# Patient Record
Sex: MALE | Race: WHITE | URBAN - METROPOLITAN AREA
[De-identification: names, ages, dates, MRNs, and addresses within clinical notes are randomized per-mention and may not be internally consistent; named-entity substitution may affect disease eponyms.]

---

## 2017-01-15 DIAGNOSIS — G30.1 LATE ONSET ALZHEIMER'S DISEASE WITH BEHAVIORAL DISTURBANCE (HCC): ICD-10-CM

## 2017-01-15 DIAGNOSIS — F02.818 LATE ONSET ALZHEIMER'S DISEASE WITH BEHAVIORAL DISTURBANCE (HCC): ICD-10-CM

## 2017-01-16 RX ORDER — MEMANTINE HYDROCHLORIDE 28 MG/1
CAPSULE, EXTENDED RELEASE ORAL
Qty: 30 CAP | Refills: 0 | Status: SHIPPED | OUTPATIENT
Start: 2017-01-16 | End: 2017-03-12 | Stop reason: SDUPTHER

## 2017-02-13 RX ORDER — MEMANTINE HYDROCHLORIDE 28 MG/1
CAPSULE, EXTENDED RELEASE ORAL
Qty: 30 CAP | Refills: 0 | Status: SHIPPED | OUTPATIENT
Start: 2017-02-13 | End: 2017-05-12 | Stop reason: SDUPTHER

## 2017-03-12 DIAGNOSIS — G30.1 LATE ONSET ALZHEIMER'S DISEASE WITH BEHAVIORAL DISTURBANCE (HCC): ICD-10-CM

## 2017-03-12 DIAGNOSIS — F02.818 LATE ONSET ALZHEIMER'S DISEASE WITH BEHAVIORAL DISTURBANCE (HCC): ICD-10-CM

## 2017-03-13 RX ORDER — MEMANTINE HYDROCHLORIDE 28 MG/1
CAPSULE, EXTENDED RELEASE ORAL
Qty: 30 CAP | Refills: 0 | Status: SHIPPED | OUTPATIENT
Start: 2017-03-13 | End: 2017-04-11 | Stop reason: SDUPTHER

## 2017-04-11 DIAGNOSIS — G30.1 LATE ONSET ALZHEIMER'S DISEASE WITH BEHAVIORAL DISTURBANCE (HCC): ICD-10-CM

## 2017-04-11 DIAGNOSIS — F02.818 LATE ONSET ALZHEIMER'S DISEASE WITH BEHAVIORAL DISTURBANCE (HCC): ICD-10-CM

## 2017-04-11 RX ORDER — MEMANTINE HYDROCHLORIDE 28 MG/1
CAPSULE, EXTENDED RELEASE ORAL
Qty: 30 CAP | Refills: 0 | Status: SHIPPED | OUTPATIENT
Start: 2017-04-11 | End: 2017-05-12 | Stop reason: SDUPTHER

## 2017-04-24 DIAGNOSIS — F02.818 LATE ONSET ALZHEIMER'S DISEASE WITH BEHAVIORAL DISTURBANCE (HCC): ICD-10-CM

## 2017-04-24 DIAGNOSIS — G30.1 LATE ONSET ALZHEIMER'S DISEASE WITH BEHAVIORAL DISTURBANCE (HCC): ICD-10-CM

## 2017-04-24 NOTE — TELEPHONE ENCOUNTER
----- Message from Guerita Portillo sent at 4/24/2017 12:08 PM EDT -----  Regarding: MARIO Fuller/Refill  Patient needs a refill of \"Rivastipmin\" sent to the [facility] at [phone].

## 2017-04-25 RX ORDER — RIVASTIGMINE 13.3 MG/24H
PATCH, EXTENDED RELEASE TRANSDERMAL
Qty: 90 PATCH | Refills: 2 | Status: SHIPPED | OUTPATIENT
Start: 2017-04-25 | End: 2017-07-17 | Stop reason: SDUPTHER

## 2017-05-11 ENCOUNTER — ED HISTORICAL/CONVERTED ENCOUNTER (OUTPATIENT)
Dept: OTHER | Age: 72
End: 2017-05-11

## 2017-05-16 ENCOUNTER — OP HISTORICAL/CONVERTED ENCOUNTER (OUTPATIENT)
Dept: OTHER | Age: 72
End: 2017-05-16

## 2017-06-08 DIAGNOSIS — G30.1 LATE ONSET ALZHEIMER'S DISEASE WITH BEHAVIORAL DISTURBANCE (HCC): ICD-10-CM

## 2017-06-08 DIAGNOSIS — F02.818 LATE ONSET ALZHEIMER'S DISEASE WITH BEHAVIORAL DISTURBANCE (HCC): ICD-10-CM

## 2017-06-09 RX ORDER — MEMANTINE HYDROCHLORIDE 28 MG/1
CAPSULE, EXTENDED RELEASE ORAL
Qty: 30 CAP | Refills: 0 | Status: SHIPPED | OUTPATIENT
Start: 2017-06-09 | End: 2017-07-17 | Stop reason: SDUPTHER

## 2017-07-17 ENCOUNTER — OFFICE VISIT (OUTPATIENT)
Dept: NEUROLOGY | Age: 72
End: 2017-07-17

## 2017-07-17 VITALS
WEIGHT: 206 LBS | HEIGHT: 69 IN | DIASTOLIC BLOOD PRESSURE: 64 MMHG | OXYGEN SATURATION: 98 % | RESPIRATION RATE: 20 BRPM | SYSTOLIC BLOOD PRESSURE: 106 MMHG | BODY MASS INDEX: 30.51 KG/M2 | HEART RATE: 62 BPM

## 2017-07-17 DIAGNOSIS — G30.1 LATE ONSET ALZHEIMER'S DISEASE WITH BEHAVIORAL DISTURBANCE (HCC): ICD-10-CM

## 2017-07-17 DIAGNOSIS — F02.80 DEMENTIA IN ALZHEIMER'S DISEASE WITH LATE ONSET: ICD-10-CM

## 2017-07-17 DIAGNOSIS — G30.1 DEMENTIA IN ALZHEIMER'S DISEASE WITH LATE ONSET: ICD-10-CM

## 2017-07-17 DIAGNOSIS — F02.818 LATE ONSET ALZHEIMER'S DISEASE WITH BEHAVIORAL DISTURBANCE (HCC): ICD-10-CM

## 2017-07-17 RX ORDER — RIVASTIGMINE 13.3 MG/24H
PATCH, EXTENDED RELEASE TRANSDERMAL
Qty: 90 PATCH | Refills: 2 | Status: SHIPPED | OUTPATIENT
Start: 2017-07-17 | End: 2018-08-21 | Stop reason: SDUPTHER

## 2017-07-17 RX ORDER — MEMANTINE HYDROCHLORIDE 28 MG/1
CAPSULE, EXTENDED RELEASE ORAL
Qty: 30 CAP | Refills: 0 | Status: CANCELLED | OUTPATIENT
Start: 2017-07-17

## 2017-07-17 RX ORDER — RIVASTIGMINE 13.3 MG/24H
PATCH, EXTENDED RELEASE TRANSDERMAL
Qty: 90 PATCH | Refills: 2 | Status: CANCELLED | OUTPATIENT
Start: 2017-07-17

## 2017-07-17 RX ORDER — MEMANTINE HYDROCHLORIDE 28 MG/1
28 CAPSULE, EXTENDED RELEASE ORAL DAILY
Qty: 90 CAP | Refills: 3 | Status: SHIPPED | OUTPATIENT
Start: 2017-07-17 | End: 2018-06-15 | Stop reason: SDUPTHER

## 2017-07-17 NOTE — MR AVS SNAPSHOT
Visit Information Date & Time Provider Department Dept. Phone Encounter #  
 7/17/2017  1:30 PM Justin Conte NP [facility] [phone] [id number] Follow-up Instructions Return in about 6 months (around 1/17/2018). Upcoming Health Maintenance Date Due DTaP/Tdap/Td series (1 - Tdap) 2/8/1964 FOBT Q 1 YEAR AGE 50-75 2/8/1993 ZOSTER VACCINE AGE 60> 2/8/2003 GLAUCOMA SCREENING Q2Y 2/8/2008 Pneumococcal 65+ Low/Medium Risk (1 of 2 - PCV13) 2/8/2008 MEDICARE YEARLY EXAM 2/8/2008 INFLUENZA AGE 9 TO ADULT 8/1/2017 Allergies as of 7/17/2017  Review Complete On: 7/17/2017 By: Justin Conte NP No Known Allergies Current Immunizations  Never Reviewed No immunizations on file. Not reviewed this visit You Were Diagnosed With   
  
 Codes Comments Late onset Alzheimer's disease with behavioral disturbance     ICD-10-CM: G30.1, F02.81 ICD-9-CM: 331.0, 294.11 Dementia in Alzheimer's disease with late onset     ICD-10-CM: G30.1, F02.80 ICD-9-CM: 331.0, 294.10 Vitals BP Pulse Resp Height(growth percentile) Weight(growth percentile) SpO2  
 106/64 62 20 5' 9\" (1.753 m) 206 lb (93.4 kg) 98% BMI Smoking Status 30.42 kg/m2 Never Smoker Vitals History BMI and BSA Data Body Mass Index Body Surface Area  
 30.42 kg/m 2 2.13 m 2 Preferred Pharmacy Pharmacy Name Phone [facility] [address] [facility] [phone] Your Updated Medication List  
  
   
This list is accurate as of: 7/17/17  3:02 PM.  Always use your most recent med list.  
  
  
  
  
 ascorbic acid (vitamin C) 500 mg tablet Commonly known as:  VITAMIN C Take  by mouth.  
  
 levothyroxine 75 mcg tablet Commonly known as:  SYNTHROID  
0.075 Tabs daily. memantine ER 28 mg capsule Commonly known as:  NAMENDA XR  
 Take 1 Cap by mouth daily. multivitamin tablet Commonly known as:  ONE A DAY Take 1 Tab by mouth daily. Nut. Min., Met.Dis.,MV, Min #2 20 gram MCTs /40 gram Pwpk Commonly known as:  AXONA Take 1 Packet by mouth daily. rivastigmine 13.3 mg/24 hour patch Commonly known as:  EXELON  
APPLY 1 PATCH TOPICALLY DAILY  
  
 VITAMIN D2 PO Take  by mouth. Prescriptions Sent to Pharmacy Refills  
 memantine ER (NAMENDA XR) 28 mg capsule 3 Sig: Take 1 Cap by mouth daily. Class: Normal  
 Pharmacy: [facility], [address] [place] [address] [address] [place] [address] Ph #: [phone] Route: Oral  
 rivastigmine (EXELON) 13.3 mg/24 hour patch 2 Sig: APPLY 1 PATCH TOPICALLY DAILY Class: Normal  
 Pharmacy: [facility], [address] [place] [address] [address] [facility] [place] [address] Ph #: [phone] Follow-up Instructions Return in about 6 months (around 1/17/2018). Patient Instructions A Healthy Lifestyle: Care Instructions Your Care Instructions A healthy lifestyle can help you feel good, stay at a healthy weight, and have plenty of energy for both work and play. A healthy lifestyle is something you can share with your whole family. A healthy lifestyle also can lower your risk for serious health problems, such as high blood pressure, heart disease, and diabetes. You can follow a few steps listed below to improve your health and the health of your family. Follow-up care is a key part of your treatment and safety. Be sure to make and go to all appointments, and call your doctor if you are having problems. Its also a good idea to know your test results and keep a list of the medicines you take. How can you care for yourself at home? · Do not eat too much sugar, fat, or fast foods. You can still have dessert and treats now and then. The goal is moderation. · Start small to improve your eating habits. Pay attention to portion sizes, drink less juice and soda pop, and eat more fruits and vegetables. ¨ Eat a healthy amount of food. A 3-ounce serving of meat, for example, is about the size of a deck of cards. Fill the rest of your plate with vegetables and whole grains. ¨ Limit the amount of soda and sports drinks you have every day. Drink more water when you are thirsty. ¨ Eat at least 5 servings of fruits and vegetables every day. It may seem like a lot, but it is not hard to reach this goal. A serving or helping is 1 piece of fruit, 1 cup of vegetables, or 2 cups of leafy, raw vegetables. Have an apple or some carrot sticks as an afternoon snack instead of a candy bar. Try to have fruits and/or vegetables at every meal. 
· Make exercise part of your daily routine. You may want to start with simple activities, such as walking, bicycling, or slow swimming. Try to be active 30 to 60 minutes every day. You do not need to do all 30 to 60 minutes all at once. For example, you can exercise 3 times a day for 10 or 20 minutes. Moderate exercise is safe for most people, but it is always a good idea to talk to your doctor before starting an exercise program. 
· Keep moving. [employer] the lawn, work in the garden, or [employer]. Take the stairs instead of the elevator at work. · If you smoke, quit. People who smoke have an increased risk for heart attack, stroke, cancer, and other lung illnesses. Quitting is hard, but there are ways to boost your chance of quitting tobacco for good. ¨ Use nicotine gum, patches, or lozenges. ¨ Ask your doctor about stop-smoking programs and medicines. ¨ Keep trying. In addition to reducing your risk of diseases in the future, you will notice some benefits soon after you stop using tobacco. If you have shortness of breath or asthma symptoms, they will likely get better within a few weeks after you quit. · Limit how much alcohol you drink. Moderate amounts of alcohol (up to 2 drinks a day for men, 1 drink a day for women) are okay. But drinking too much can lead to liver problems, high blood pressure, and other health problems. Family health If you have a family, there are many things you can do together to improve your health. · Eat meals together as a family as often as possible. · Eat healthy foods. This includes fruits, vegetables, lean meats and dairy, and whole grains. · Include your family in your fitness plan. Most people think of activities such as jogging or tennis as the way to fitness, but there are many ways you and your family can be more active. Anything that makes you breathe hard and gets your heart pumping is exercise. Here are some tips: 
¨ Walk to do errands or to take your child to school or the bus. ¨ Go for a family bike ride after dinner instead of watching TV. Where can you learn more? Go to http://miranda-mavis.info/. Enter A474 in the search box to learn more about \"A Healthy Lifestyle: Care Instructions. \" Current as of: July 26, 2016 Content Version: 11.3 © 1136-9981 [employer]. Care instructions adapted under license by [employer] (which disclaims liability or warranty for this information). If you have questions about a medical condition or this instruction, always ask your healthcare professional. [place] [address] any warranty or liability for your use of this information. Introducing [facility] & HEALTH SERVICES! [employer] introduces [email or web address] patient portal. Now you can access parts of your medical record, email your doctor's office, and request medication refills online. 1. In your internet browser, go to [email or web address] 2. Click on the First Time User? Click Here link in the Sign In box. You will see the New Member Sign Up page. 3. Enter your Apriva Access Code exactly as it appears below. You will not need to use this code after youve completed the sign-up process. If you do not sign up before the expiration date, you must request a new code. · Apriva Access Code: [email or web address] Expires: 10/15/2017  2:37 PM 
 
4. Enter the last four digits of your Social Security Number (xxxx) and Date of Birth (mm/dd/yyyy) as indicated and click Submit. You will be taken to the next sign-up page. 5. Create a Apriva ID. This will be your Apriva login ID and cannot be changed, so think of one that is secure and easy to remember. 6. Create a Apriva password. You can change your password at any time. 7. Enter your Password Reset Question and Answer. This can be used at a later time if you forget your password. 8. Enter your e-mail address. You will receive e-mail notification when new information is available in [address]. 9. Click Sign Up. You can now view and download portions of your medical record. 10. Click the Download Summary menu link to download a portable copy of your medical information. If you have questions, please visit the Frequently Asked Questions section of the Apriva website. Remember, Apriva is NOT to be used for urgent needs. For medical emergencies, dial 911. Now available from your iPhone and Android! Please provide this summary of care documentation to your next provider. Your primary care clinician is listed as [address] [facility]. If you have any questions after today's visit, please call [phone].

## 2017-07-17 NOTE — PROGRESS NOTES
Date:  2017    Name:  Kristi Esteban. :  1945  MRN:  [id number]       REQUESTING PHYSICIAN:  Trine Klinefelter, MD    Chief Complaint   Patient presents with    Dementia     HISTORY OF PRESENT ILLNESS: Follow up for dementia. Patient is doing well on Namenda XR and Exelon Patch. Still displaying some OCD type behavior with hallucination   (  trash that is not there) . He has been trying to  something off the floor but he is not always picking something up. He said that he thinks he is picking up trash. Repeating or asking the same thing over and over? This occur mostly with time( \"what time are we leaving\" \"Is it time to go yet\"  Forgetting appointments, family occasions, holidays? Yes, people names. This is about to same has not changed    Needs help managing finances? Yes, wife is  Managing finances, shopping, and medication. Getting lost in familiar places? No, wife is always presents   Need help with ADLs? Patient is having some incontient with urine and bowel movement. Wife is helping with ADL(bathing,bathroon). He isstill feelding himself    Unsafe behaviors:   Aggression:  No aggression  No Hallucination   Wandering: no  Kitchen:NO  Driving (obeying signs, etc);  No Patient not driving     Answers provided by:Family member: wife   Other    Review of Systems - History obtained from chart review and unobtainable from patient due to mental status  General ROS: negative for - fever, sleep disturbance, weight gain or weight loss  Psychological ROS: negative  positive for - concentration difficulties, disorientation and memory difficulties  ENT ROS: negative for - headaches, hearing change, vertigo or visual changes  Hematological and Lymphatic ROS: negative  Endocrine ROS: negative  Respiratory ROS: no cough, shortness of breath, or wheezing  Cardiovascular ROS: negative for - loss of consciousness, orthopnea or shortness of breath  Gastrointestinal ROS: no abdominal pain, change in bowel habits, or black or bloody stools  Genito-Urinary ROS: no dysuria, trouble voiding, or hematuria  positive for - incontinence  Musculoskeletal ROS: negative  positive for - gait disturbance and left leg fracture  Neurological ROS: negative  positive for - impaired coordination/balance and memory loss  Dermatological ROS: negative     Current Outpatient Prescriptions   Medication Sig    memantine ER (NAMENDA XR) 28 mg capsule Take 1 Cap by mouth daily.  rivastigmine (EXELON) 13.3 mg/24 hour patch APPLY 1 PATCH TOPICALLY DAILY    Nut. Min., Met.Dis.,MV, Min #2 (AXONA) 20 gram MCTs /40 gram pwpk Take 1 Packet by mouth daily.  ERGOCALCIFEROL, VITAMIN D2, (VITAMIN D2 PO) Take  by mouth.  multivitamin (ONE A DAY) tablet Take 1 Tab by mouth daily.  ascorbic acid (VITAMIN C) 500 mg tablet Take  by mouth.  levothyroxine (SYNTHROID) 75 mcg tablet 0.075 Tabs daily. No current facility-administered medications for this visit. No Known Allergies  Past Medical History:   Diagnosis Date    Alcohol abuse     Falls     Fatigue     Liver disease     Memory loss     Muscle weakness     N&V (nausea and vomiting)     Polio     Seizures (HCC)     Sleep apnea     Snoring     Thyroid disease      Past Surgical History:   Procedure Laterality Date    HX APPENDECTOMY      HX ORTHOPAEDIC  05/2017    repaired 3 bones broken in his leg     HX OTHER SURGICAL  March 2016    gallbladder removed      Social History     Social History    Marital status:      Spouse name: N/A    Number of children: N/A    Years of education: N/A     Occupational History    Not on file. Social History Main Topics    Smoking status: Never Smoker    Smokeless tobacco: Not on file    Alcohol use Not on file    Drug use: Not on file    Sexual activity: Not on file     Other Topics Concern    Not on file     Social History Narrative     History reviewed. No pertinent family history.     PHYSICAL EXAMINATION:    Visit Vitals    /64    Pulse 62    Resp 20    Ht 5' 9\" (1.753 m)    Wt 93.4 kg (206 lb)    SpO2 98%    BMI 30.42 kg/m2     General: Well defined, nourished, and groomed individual in no acute distress.    Neck: Supple, nontender, no bruits, no pain with resistance to active range of motion.    Heart: Regular rate and rhythm, no murmurs, rub, or gallop. Normal S1S2.    Lungs: Clear to auscultation bilaterally with equal chest expansion, no cough, no wheeze    Musculoskeletal: Extremities revealed no edema and had full range of motion of joints.    Psych: Good mood and bright affect    NEUROLOGICAL EXAMINATION:    Mental Status: Alert and oriented to person and place. MMSE 23/30 patient was unable to recall the 3 objects,  Could not repeat \" No if's ,ands,or buts.    Cranial Nerves:    II, III, IV, VI: Visual acuity grossly intact. Visual fields are normal.    Pupils are equal, round, and reactive to light and accommodation.    Extra-ocular movements are full and fluid. Fundoscopic exam was benign, no ptosis or nystagmus.    V-XII: Hearing is grossly intact. Facial features are symmetric, with normal sensation and strength. The palate rises symmetrically and the tongue protrudes midline. Sternocleidomastoids 5/5.    Motor Examination: Normal tone, bulk, and strength, right  5/5 muscle strength throughout. Left leg patient has a boot. Sensory exam: Normal throughout to temperature. Coordination: Finger to nose and rapid arm movement testing was normal. No resting or intention tremor    Gait and Station: Steady while walking. Normal arm swing. No pronator drift. No muscle wasting or fasiculations noted.    Reflexes: DTRs 2+ throughout. ASSESSMENT AND PLAN    ICD-10-CM ICD-9-CM    1. Late onset Alzheimer's disease with behavioral disturbance G30.1 331.0 memantine ER (NAMENDA XR) 28 mg capsule    F02.81 294.11 rivastigmine (EXELON) 13.3 mg/24 hour patch   2.  Dementia in Alzheimer's disease with late onset G30.1 331.0     F02.80 294.10      Late onset Alzheimer's type dementia with hallucinations. Wife state he is not as aggressive and the hallucination is not as severe. He will continue taking Namenda extended release 28 mg daily. And Exelon patch  13.3 mg daily, and Axona for additional treatment. For the hallucinations, we did discuss potential treatment options, because his insurance denied Nuplazid for off label treatment. Wife decided  to monitoring patient until it gets worrisome. Further discussed disease progression and prognosis. Follow-up in 6 months or sooner if needed.     Crecencio Galeazzi FNP-SONIA

## 2017-07-17 NOTE — PATIENT INSTRUCTIONS

## 2017-07-17 NOTE — PROGRESS NOTES
Hasn't really had any changes since last visit   Had surgery on his left leg, got up when he wasn't supposed to and broke 3 bones in his leg and foot, had surgery to repair   Still Non weight bearing for the moment

## 2018-08-15 ENCOUNTER — TELEPHONE (OUTPATIENT)
Dept: NEUROLOGY | Age: 73
End: 2018-08-15

## 2018-08-15 DIAGNOSIS — F02.818 LATE ONSET ALZHEIMER'S DISEASE WITH BEHAVIORAL DISTURBANCE (HCC): ICD-10-CM

## 2018-08-15 DIAGNOSIS — G30.1 LATE ONSET ALZHEIMER'S DISEASE WITH BEHAVIORAL DISTURBANCE (HCC): ICD-10-CM

## 2018-08-15 NOTE — TELEPHONE ENCOUNTER
----- Message from Elaine Shelton sent at 8/15/2018  2:00 PM EDT -----  Regarding: Alexandrea/Refill  [place] Saavedra pt's wife called requesting a refill for the pt Exelon patch. Pt use the [facility] pharmacy in [facility] phone number is [phone].  Pt best contact number is [phone]

## 2018-08-21 ENCOUNTER — HOME HEALTH ADMISSION (OUTPATIENT)
Dept: HOME HEALTH SERVICES | Facility: HOME HEALTH | Age: 73
End: 2018-08-21
Payer: MEDICARE

## 2018-08-21 ENCOUNTER — OFFICE VISIT (OUTPATIENT)
Dept: NEUROLOGY | Age: 73
End: 2018-08-21

## 2018-08-21 VITALS
HEART RATE: 75 BPM | HEIGHT: 69 IN | SYSTOLIC BLOOD PRESSURE: 122 MMHG | DIASTOLIC BLOOD PRESSURE: 72 MMHG | OXYGEN SATURATION: 98 % | BODY MASS INDEX: 30.36 KG/M2 | WEIGHT: 205 LBS

## 2018-08-21 DIAGNOSIS — R26.9 GAIT DISTURBANCE: ICD-10-CM

## 2018-08-21 DIAGNOSIS — F02.818 LATE ONSET ALZHEIMER'S DISEASE WITH BEHAVIORAL DISTURBANCE (HCC): Primary | ICD-10-CM

## 2018-08-21 DIAGNOSIS — F03.92 HALLUCINATIONS DUE TO LATE ONSET DEMENTIA: ICD-10-CM

## 2018-08-21 DIAGNOSIS — G30.1 LATE ONSET ALZHEIMER'S DISEASE WITH BEHAVIORAL DISTURBANCE (HCC): Primary | ICD-10-CM

## 2018-08-21 RX ORDER — RIVASTIGMINE 13.3 MG/24H
PATCH, EXTENDED RELEASE TRANSDERMAL
Qty: 90 PATCH | Refills: 3 | Status: SHIPPED | OUTPATIENT
Start: 2018-08-21 | End: 2019-02-21 | Stop reason: SDUPTHER

## 2018-08-21 RX ORDER — QUETIAPINE FUMARATE 25 MG/1
25 TABLET, FILM COATED ORAL
Qty: 90 TAB | Refills: 3 | Status: SHIPPED | OUTPATIENT
Start: 2018-08-21 | End: 2019-02-21 | Stop reason: ALTCHOICE

## 2018-08-21 RX ORDER — MEMANTINE HYDROCHLORIDE 28 MG/1
CAPSULE, EXTENDED RELEASE ORAL
Qty: 90 CAP | Refills: 3 | Status: SHIPPED | OUTPATIENT
Start: 2018-08-21 | End: 2019-02-21 | Stop reason: SDUPTHER

## 2018-08-21 RX ORDER — MULTIVIT WITH MINERALS/HERBS
1 TABLET ORAL DAILY
COMMUNITY
End: 2018-09-01 | Stop reason: CLARIF

## 2018-08-21 NOTE — PROGRESS NOTES
Per wife she states patients memory is fading. He is aggressive, hard to get motivated, wont follow directions, and has visual hallucinations at times. He has been out of the Exelon patch for 1 week. He is fatigued throughout the day and sleeps like a rock at night. Wife states he sings, laughs and talks in his sleep. She states he doesn't wander but she will tell him to take the trash out but when he gets outside he forgets to come in.

## 2018-08-21 NOTE — MR AVS SNAPSHOT
[address] [facility] [address] [phone] Patient: Saeid Cardoso. MRN: [id number] MDJ:[id number]/7/3403 Visit Information Date & Time Provider Department Dept. Phone Encounter #  
 8/21/2018  8:30 AM Laisha [place] NP [facility] [phone] [id number] Follow-up Instructions Return in about 6 months (around 2/21/2019). Upcoming Health Maintenance Date Due DTaP/Tdap/Td series (1 - Tdap) 2/8/1964 ZOSTER VACCINE AGE 60> 12/8/2002 GLAUCOMA SCREENING Q2Y 2/8/2008 Pneumococcal 65+ Low/Medium Risk (1 of 2 - PCV13) 2/8/2008 Influenza Age 5 to Adult 8/1/2018 Allergies as of 8/21/2018  Review Complete On: 8/21/2018 By: Laisha [place] NP No Known Allergies Current Immunizations  Never Reviewed No immunizations on file. Not reviewed this visit You Were Diagnosed With   
  
 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 2 Vitals BP Pulse Height(growth percentile) Weight(growth percentile) SpO2 BMI  
 122/72 75 5' 9\" (1.753 m) 205 lb (93 kg) 98% 30.27 kg/m2 Smoking Status Never Smoker Vitals History BMI and BSA Data Body Mass Index Body Surface Area  
 30.27 kg/m 2 2.13 m 2 Preferred Pharmacy Pharmacy Name Phone [facility], [address] [place] [address] [phone] Your Updated Medication List  
  
   
This list is accurate as of 8/21/18  9:17 AM.  Always use your most recent med list.  
  
  
  
  
 ascorbic acid (vitamin C) 500 mg tablet Commonly known as:  VITAMIN C Take  by mouth.  
  
 b complex vitamins tablet Take 1 Tab by mouth daily. levothyroxine 75 mcg tablet Commonly known as:  SYNTHROID  
0.075 Tabs daily. memantine ER 28 mg capsule Commonly known as:  NAMENDA XR  
TAKE 1 CAPSULE BY MOUTH EVERY DAY  
  
 multivitamin tablet Commonly known as:  ONE A DAY Take 1 Tab by mouth daily. QUEtiapine 25 mg tablet Commonly known as:  SEROquel Take 1 Tab by mouth nightly. rivastigmine 13.3 mg/24 hour patch Commonly known as:  EXELON  
APPLY 1 PATCH TOPICALLY DAILY  
  
 VITAMIN D2 PO Take  by mouth. VITAMIN E (DL, ACETATE) PO Take  by mouth. Prescriptions Sent to Pharmacy Refills  
 rivastigmine (EXELON) 13.3 mg/24 hour patch 3 Sig: APPLY 1 PATCH TOPICALLY DAILY Class: Normal  
 Pharmacy: [facility] [address] [place] [address] Ph #: [phone]  
 memantine ER (NAMENDA XR) 28 mg capsule 3 Sig: TAKE 1 CAPSULE BY MOUTH EVERY DAY Class: Normal  
 Pharmacy: [facility] [address] [place] [address] Ph #: [phone] QUEtiapine (SEROQUEL) 25 mg tablet 3 Sig: Take 1 Tab by mouth nightly. Class: Normal  
 Pharmacy: [facility] [address] [place] [address] Ph #: [phone] Route: Oral  
  
We Performed the Following [address] [place] [address] Comments:  
 Home PT for gait and balance Follow-up Instructions Return in about 6 months (around 2/21/2019). Referral Information Referral ID Referred By Referred To  
  
 [id number] Yan Giron Not Available Visits Status Start Date End Date 1 New Request 8/21/18 8/21/19 If your referral has a status of pending review or denied, additional information will be sent to support the outcome of this decision. Patient Instructions   
[email or web address] 
 
 
  
Introducing hospitals & HEALTH SERVICES!    
 Nohemi Keene introduces Izooble patient portal. Now you can access parts of your medical record, email your doctor's office, and request medication refills online. 1. In your internet browser, go to [email or web address] 2. Click on the First Time User? Click Here link in the Sign In box. You will see the New Member Sign Up page. 3. Enter your SimulScribe Access Code exactly as it appears below. You will not need to use this code after youve completed the sign-up process. If you do not sign up before the expiration date, you must request a new code. · SimulScribe Access Code: [email or web address] Expires: 11/19/2018  9:17 AM 
 
4. Enter the last four digits of your Social Security Number (xxxx) and Date of Birth (mm/dd/yyyy) as indicated and click Submit. You will be taken to the next sign-up page. 5. Create a SimulScribe ID. This will be your SimulScribe login ID and cannot be changed, so think of one that is secure and easy to remember. 6. Create a SimulScribe password. You can change your password at any time. 7. Enter your Password Reset Question and Answer. This can be used at a later time if you forget your password. 8. Enter your e-mail address. You will receive e-mail notification when new information is available in [address] 9. Click Sign Up. You can now view and download portions of your medical record. 10. Click the Download Summary menu link to download a portable copy of your medical information. If you have questions, please visit the Frequently Asked Questions section of the SimulScribe website. Remember, SimulScribe is NOT to be used for urgent needs. For medical emergencies, dial 911. Now available from your iPhone and Android! Please provide this summary of care documentation to your next provider. Your primary care clinician is listed as [address]. If you have any questions after today's visit, please call [phone].

## 2018-08-21 NOTE — PROGRESS NOTES
Date:     Name:  Brian Allred. :  1945  MRN:  [id number]       REQUESTING PHYSICIAN:  Lida Webster MD    Chief Complaint   Patient presents with    Memory Loss     HISTORY OF PRESENT ILLNESS:  Follow up for dementia, likely Alzheimer's type. He is accompanied by his wife who helps to provide history. Still has a lot of repetitive behaviors as well as OCD like behaviors. He is doing this much more than he used to but is a bit a little hard to get him motivated and he can be a little agitated at times. He is not combative or violent. With regard to his ADLs, he needs a lot of verbal prompts. No dangerous behaviors or wandering. He did have a period in which he was having issues getting out of his walk in shower, stepping over the lip. He has been falling about once a week and needs to hold on to something. If he is walking with something in his hands he has to be very careful.      Answers provided by:  Patient  Family member: wife    Review of Systems - History obtained from spouse and the patient  General ROS: negative  Psychological ROS: positive for - disorientation, irritability and memory difficulties  ENT ROS: negative for - headaches, hearing change, nasal congestion, nasal discharge, tinnitus, vertigo, visual changes or vocal changes  Hematological and Lymphatic ROS: negative for - bleeding problems, blood clots, bruising, jaundice, night sweats or weight loss  Endocrine ROS: negative for - skin changes, temperature intolerance or unexpected weight changes  Respiratory ROS: no cough, shortness of breath, or wheezing  Cardiovascular ROS: no chest pain or dyspnea on exertion  Gastrointestinal ROS: no abdominal pain, change in bowel habits, or black or bloody stools  Genito-Urinary ROS: no dysuria, trouble voiding, or hematuria  Musculoskeletal ROS: positive for - gait disturbance  Neurological ROS: no TIA or stroke symptoms  positive for - behavioral changes, confusion, gait disturbance, impaired coordination/balance and memory loss  negative for - bowel and bladder control changes, dizziness, headaches, numbness/tingling, seizures, speech problems, tremors, visual changes or weakness  Dermatological ROS: negative for - nail changes, pruritus or rash     Current Outpatient Prescriptions   Medication Sig    vitamin E, dl,tocopheryl acet, (VITAMIN E, DL, ACETATE, PO) Take  by mouth.  b complex vitamins tablet Take 1 Tab by mouth daily.  rivastigmine (EXELON) 13.3 mg/24 hour patch APPLY 1 PATCH TOPICALLY DAILY    memantine ER (NAMENDA XR) 28 mg capsule TAKE 1 CAPSULE BY MOUTH EVERY DAY    QUEtiapine (SEROQUEL) 25 mg tablet Take 1 Tab by mouth nightly.  ERGOCALCIFEROL, VITAMIN D2, (VITAMIN D2 PO) Take  by mouth.  multivitamin (ONE A DAY) tablet Take 1 Tab by mouth daily.  ascorbic acid (VITAMIN C) 500 mg tablet Take  by mouth.  levothyroxine (SYNTHROID) 75 mcg tablet 0.075 Tabs daily. No current facility-administered medications for this visit. No Known Allergies  Past Medical History:   Diagnosis Date    Alcohol abuse     Falls     Fatigue     Liver disease     Memory loss     Muscle weakness     N&V (nausea and vomiting)     Polio     Seizures (HCC)     Sleep apnea     Snoring     Thyroid disease      Past Surgical History:   Procedure Laterality Date    HX APPENDECTOMY      HX ORTHOPAEDIC  05/2017    repaired 3 bones broken in his leg     HX OTHER SURGICAL  March 2016    gallbladder removed      Social History     Social History    Marital status:      Spouse name: N/A    Number of children: N/A    Years of education: N/A     Occupational History    Not on file. Social History Main Topics    Smoking status: Never Smoker    Smokeless tobacco: Never Used    Alcohol use Not on file    Drug use: Not on file    Sexual activity: Not on file     Other Topics Concern    Not on file     Social History Narrative     History reviewed.  No pertinent family history. PHYSICAL EXAMINATION:    Visit Vitals    /72    Pulse 75    Ht 5' 9\" (1.753 m)    Wt 93 kg (205 lb)    SpO2 98%    BMI 30.27 kg/m2     General: Well defined, nourished, and groomed individual in no acute distress.    Neck: Supple, nontender, no bruits, no pain with resistance to active range of motion.    Heart: Regular rate and rhythm, no murmurs, rub, or gallop. Normal S1S2.    Lungs: Clear to auscultation bilaterally with equal chest expansion, no cough, no wheeze    Musculoskeletal: Extremities revealed no edema and had full range of motion of joints.    Psych: Good mood and bright affect    NEUROLOGICAL EXAMINATION:    Mental Status: Alert and oriented to person and place. Cranial Nerves:    II, III, IV, VI: Visual acuity grossly intact. Visual fields are normal.    Pupils are equal, round, and reactive to light and accommodation.    Extra-ocular movements are full and fluid. Fundoscopic exam was benign, no ptosis or nystagmus.    V-XII: Hearing is grossly intact. Facial features are symmetric, with normal sensation and strength. The palate rises symmetrically and the tongue protrudes midline. Sternocleidomastoids 5/5.    Motor Examination: Normal tone, bulk, and strength, 5/5 muscle strength throughout. No cogwheel rigidity or clonus present.    Sensory exam: Normal throughout to temperature. Normal proprioception.    Coordination: Finger to nose and rapid arm movement testing was normal. No resting or intention tremor    Gait and Station: Steady while walking on toes, heels, and with tandem walking. Normal arm swing. No Rhomberg or pronator drift. No muscle wasting or fasiculations noted.    Reflexes: DTRs 2+ throughout. ASSESSMENT AND PLAN    ICD-10-CM ICD-9-CM    1. Late onset Alzheimer's disease with behavioral disturbance G30.1 331.0 rivastigmine (EXELON) 13.3 mg/24 hour patch    F02.81 294.11 memantine ER (NAMENDA XR) 28 mg capsule   2.  Hallucinations due to late onset dementia F03.90 290.0 QUEtiapine (SEROQUEL) 25 mg tablet    R44.3 780.1    3. Gait disturbance R26.9 781.2 REFERRAL TO HOME HEALTH     Late onset Alzheimer's disease with behavioral disturbance and hallucinations. The hallucinations and behavioral disturbance are well treated with the Seroquel and redirection. Overall, the AD is fairly stable with expected progression. Continue with Namenda, Exelon patch and Seroquel as previously prescribed. With regard to the gait issues, we did discuss physical therapy. I do believe that this will be beneficial. However, he will need to have someone to remind him about doing the exercises and will likely need to have someone help him remember how to do the HEP. She and the other care giver who lives in the house were willing to learn the exercises to help with this. Will refer for [facility]. Follow up in six months or sooner if needed. Tara Tucker

## 2018-08-23 ENCOUNTER — TELEPHONE (OUTPATIENT)
Dept: NEUROLOGY | Age: 73
End: 2018-08-23

## 2018-08-23 ENCOUNTER — HOME CARE VISIT (OUTPATIENT)
Dept: SCHEDULING | Facility: HOME HEALTH | Age: 73
End: 2018-08-23

## 2018-08-23 NOTE — TELEPHONE ENCOUNTER
Ashanti Colon a PT with [facility] called and asked for his order to be dated for tomorrow due to him unable to start his PT right away. She just  needs a verbal order.

## 2018-08-23 NOTE — TELEPHONE ENCOUNTER
----- Message from Blayne Matos sent at 8/23/2018  3:29 PM EDT -----  Regarding: Jonny/telephone  Yoko with [address] is requesting the pts notes  signed so she can submitted it to Medicare. Yoko phone is [phone].

## 2018-08-24 ENCOUNTER — HOME CARE VISIT (OUTPATIENT)
Dept: SCHEDULING | Facility: HOME HEALTH | Age: 73
End: 2018-08-24
Payer: MEDICARE

## 2018-08-24 PROCEDURE — 3331090002 HH PPS REVENUE DEBIT

## 2018-08-24 PROCEDURE — G0151 HHCP-SERV OF PT,EA 15 MIN: HCPCS

## 2018-08-24 PROCEDURE — 400013 HH SOC

## 2018-08-24 PROCEDURE — 3331090001 HH PPS REVENUE CREDIT

## 2018-08-25 PROCEDURE — 3331090001 HH PPS REVENUE CREDIT

## 2018-08-25 PROCEDURE — 3331090002 HH PPS REVENUE DEBIT

## 2018-08-26 VITALS
RESPIRATION RATE: 16 BRPM | TEMPERATURE: 97.7 F | DIASTOLIC BLOOD PRESSURE: 60 MMHG | HEART RATE: 59 BPM | SYSTOLIC BLOOD PRESSURE: 110 MMHG | OXYGEN SATURATION: 96 %

## 2018-08-26 PROCEDURE — 3331090002 HH PPS REVENUE DEBIT

## 2018-08-26 PROCEDURE — 3331090001 HH PPS REVENUE CREDIT

## 2018-08-27 ENCOUNTER — HOME CARE VISIT (OUTPATIENT)
Dept: SCHEDULING | Facility: HOME HEALTH | Age: 73
End: 2018-08-27
Payer: MEDICARE

## 2018-08-27 VITALS — SYSTOLIC BLOOD PRESSURE: 110 MMHG | HEART RATE: 66 BPM | OXYGEN SATURATION: 99 % | DIASTOLIC BLOOD PRESSURE: 64 MMHG

## 2018-08-27 PROCEDURE — 3331090002 HH PPS REVENUE DEBIT

## 2018-08-27 PROCEDURE — G0151 HHCP-SERV OF PT,EA 15 MIN: HCPCS

## 2018-08-27 PROCEDURE — 3331090001 HH PPS REVENUE CREDIT

## 2018-08-28 PROCEDURE — 3331090002 HH PPS REVENUE DEBIT

## 2018-08-28 PROCEDURE — 3331090001 HH PPS REVENUE CREDIT

## 2018-08-29 PROCEDURE — 3331090001 HH PPS REVENUE CREDIT

## 2018-08-29 PROCEDURE — 3331090002 HH PPS REVENUE DEBIT

## 2018-08-30 ENCOUNTER — HOME CARE VISIT (OUTPATIENT)
Dept: SCHEDULING | Facility: HOME HEALTH | Age: 73
End: 2018-08-30
Payer: MEDICARE

## 2018-08-30 PROCEDURE — 3331090001 HH PPS REVENUE CREDIT

## 2018-08-30 PROCEDURE — 3331090002 HH PPS REVENUE DEBIT

## 2018-08-30 PROCEDURE — G0151 HHCP-SERV OF PT,EA 15 MIN: HCPCS

## 2018-08-31 VITALS
SYSTOLIC BLOOD PRESSURE: 100 MMHG | OXYGEN SATURATION: 96 % | TEMPERATURE: 97.8 F | HEART RATE: 61 BPM | RESPIRATION RATE: 18 BRPM | DIASTOLIC BLOOD PRESSURE: 50 MMHG

## 2018-08-31 PROCEDURE — 3331090002 HH PPS REVENUE DEBIT

## 2018-08-31 PROCEDURE — 3331090001 HH PPS REVENUE CREDIT

## 2018-09-01 PROCEDURE — 3331090001 HH PPS REVENUE CREDIT

## 2018-09-01 PROCEDURE — 3331090002 HH PPS REVENUE DEBIT

## 2018-09-02 PROCEDURE — 3331090001 HH PPS REVENUE CREDIT

## 2018-09-02 PROCEDURE — 3331090002 HH PPS REVENUE DEBIT

## 2018-09-03 PROCEDURE — 3331090002 HH PPS REVENUE DEBIT

## 2018-09-03 PROCEDURE — 3331090001 HH PPS REVENUE CREDIT

## 2018-09-04 PROCEDURE — 3331090001 HH PPS REVENUE CREDIT

## 2018-09-04 PROCEDURE — 3331090002 HH PPS REVENUE DEBIT

## 2018-09-05 ENCOUNTER — HOME CARE VISIT (OUTPATIENT)
Dept: SCHEDULING | Facility: HOME HEALTH | Age: 73
End: 2018-09-05
Payer: MEDICARE

## 2018-09-05 VITALS
DIASTOLIC BLOOD PRESSURE: 68 MMHG | RESPIRATION RATE: 16 BRPM | TEMPERATURE: 98 F | SYSTOLIC BLOOD PRESSURE: 118 MMHG | OXYGEN SATURATION: 96 % | HEART RATE: 64 BPM

## 2018-09-05 PROCEDURE — 3331090002 HH PPS REVENUE DEBIT

## 2018-09-05 PROCEDURE — G0151 HHCP-SERV OF PT,EA 15 MIN: HCPCS

## 2018-09-05 PROCEDURE — 3331090001 HH PPS REVENUE CREDIT

## 2018-09-06 PROCEDURE — 3331090001 HH PPS REVENUE CREDIT

## 2018-09-06 PROCEDURE — 3331090002 HH PPS REVENUE DEBIT

## 2018-09-07 ENCOUNTER — HOME CARE VISIT (OUTPATIENT)
Dept: SCHEDULING | Facility: HOME HEALTH | Age: 73
End: 2018-09-07
Payer: MEDICARE

## 2018-09-07 PROCEDURE — 3331090001 HH PPS REVENUE CREDIT

## 2018-09-07 PROCEDURE — 3331090002 HH PPS REVENUE DEBIT

## 2018-09-08 PROCEDURE — 3331090002 HH PPS REVENUE DEBIT

## 2018-09-08 PROCEDURE — 3331090001 HH PPS REVENUE CREDIT

## 2018-09-09 PROCEDURE — 3331090002 HH PPS REVENUE DEBIT

## 2018-09-09 PROCEDURE — 3331090001 HH PPS REVENUE CREDIT

## 2018-09-10 ENCOUNTER — HOME CARE VISIT (OUTPATIENT)
Dept: HOME HEALTH SERVICES | Facility: HOME HEALTH | Age: 73
End: 2018-09-10
Payer: MEDICARE

## 2018-09-10 ENCOUNTER — HOME CARE VISIT (OUTPATIENT)
Dept: SCHEDULING | Facility: HOME HEALTH | Age: 73
End: 2018-09-10
Payer: MEDICARE

## 2018-09-10 VITALS
HEART RATE: 63 BPM | TEMPERATURE: 97.6 F | DIASTOLIC BLOOD PRESSURE: 50 MMHG | SYSTOLIC BLOOD PRESSURE: 90 MMHG | OXYGEN SATURATION: 96 %

## 2018-09-10 PROCEDURE — 3331090001 HH PPS REVENUE CREDIT

## 2018-09-10 PROCEDURE — 3331090002 HH PPS REVENUE DEBIT

## 2018-09-10 PROCEDURE — G0151 HHCP-SERV OF PT,EA 15 MIN: HCPCS

## 2018-09-11 PROCEDURE — 3331090001 HH PPS REVENUE CREDIT

## 2018-09-11 PROCEDURE — 3331090002 HH PPS REVENUE DEBIT

## 2018-09-12 PROCEDURE — 3331090001 HH PPS REVENUE CREDIT

## 2018-09-12 PROCEDURE — 3331090002 HH PPS REVENUE DEBIT

## 2018-09-13 PROCEDURE — 3331090002 HH PPS REVENUE DEBIT

## 2018-09-13 PROCEDURE — 3331090001 HH PPS REVENUE CREDIT

## 2018-09-14 ENCOUNTER — HOME CARE VISIT (OUTPATIENT)
Dept: SCHEDULING | Facility: HOME HEALTH | Age: 73
End: 2018-09-14
Payer: MEDICARE

## 2018-09-14 PROCEDURE — G0151 HHCP-SERV OF PT,EA 15 MIN: HCPCS

## 2018-09-14 PROCEDURE — 3331090001 HH PPS REVENUE CREDIT

## 2018-09-14 PROCEDURE — 3331090002 HH PPS REVENUE DEBIT

## 2018-09-15 VITALS
SYSTOLIC BLOOD PRESSURE: 100 MMHG | TEMPERATURE: 97.5 F | HEART RATE: 78 BPM | RESPIRATION RATE: 16 BRPM | DIASTOLIC BLOOD PRESSURE: 58 MMHG

## 2018-09-15 PROCEDURE — 3331090002 HH PPS REVENUE DEBIT

## 2018-09-15 PROCEDURE — 3331090001 HH PPS REVENUE CREDIT

## 2018-09-16 PROCEDURE — 3331090002 HH PPS REVENUE DEBIT

## 2018-09-16 PROCEDURE — 3331090001 HH PPS REVENUE CREDIT

## 2018-09-17 ENCOUNTER — HOME CARE VISIT (OUTPATIENT)
Dept: SCHEDULING | Facility: HOME HEALTH | Age: 73
End: 2018-09-17
Payer: MEDICARE

## 2018-09-17 PROCEDURE — 3331090002 HH PPS REVENUE DEBIT

## 2018-09-17 PROCEDURE — 3331090001 HH PPS REVENUE CREDIT

## 2018-09-18 PROCEDURE — 3331090002 HH PPS REVENUE DEBIT

## 2018-09-18 PROCEDURE — 3331090001 HH PPS REVENUE CREDIT

## 2018-09-19 PROCEDURE — 3331090002 HH PPS REVENUE DEBIT

## 2018-09-19 PROCEDURE — 3331090001 HH PPS REVENUE CREDIT

## 2018-09-20 PROCEDURE — 3331090002 HH PPS REVENUE DEBIT

## 2018-09-20 PROCEDURE — 3331090001 HH PPS REVENUE CREDIT

## 2018-09-21 ENCOUNTER — HOME CARE VISIT (OUTPATIENT)
Dept: SCHEDULING | Facility: HOME HEALTH | Age: 73
End: 2018-09-21
Payer: MEDICARE

## 2018-09-21 PROCEDURE — 3331090001 HH PPS REVENUE CREDIT

## 2018-09-21 PROCEDURE — 3331090002 HH PPS REVENUE DEBIT

## 2018-09-21 PROCEDURE — G0151 HHCP-SERV OF PT,EA 15 MIN: HCPCS

## 2018-09-22 PROCEDURE — 3331090002 HH PPS REVENUE DEBIT

## 2018-09-22 PROCEDURE — 3331090001 HH PPS REVENUE CREDIT

## 2018-09-23 PROCEDURE — 3331090002 HH PPS REVENUE DEBIT

## 2018-09-23 PROCEDURE — 3331090001 HH PPS REVENUE CREDIT

## 2018-11-22 ENCOUNTER — ED HISTORICAL/CONVERTED ENCOUNTER (OUTPATIENT)
Dept: OTHER | Age: 73
End: 2018-11-22

## 2019-02-21 ENCOUNTER — OFFICE VISIT (OUTPATIENT)
Dept: NEUROLOGY | Age: 74
End: 2019-02-21

## 2019-02-21 VITALS
DIASTOLIC BLOOD PRESSURE: 78 MMHG | HEART RATE: 57 BPM | OXYGEN SATURATION: 98 % | HEIGHT: 69 IN | WEIGHT: 205 LBS | BODY MASS INDEX: 30.36 KG/M2 | SYSTOLIC BLOOD PRESSURE: 110 MMHG | RESPIRATION RATE: 20 BRPM

## 2019-02-21 DIAGNOSIS — G30.1 LATE ONSET ALZHEIMER'S DISEASE WITH BEHAVIORAL DISTURBANCE (HCC): ICD-10-CM

## 2019-02-21 DIAGNOSIS — F02.818 LATE ONSET ALZHEIMER'S DISEASE WITH BEHAVIORAL DISTURBANCE (HCC): ICD-10-CM

## 2019-02-21 RX ORDER — MEMANTINE HYDROCHLORIDE 28 MG/1
CAPSULE, EXTENDED RELEASE ORAL
Qty: 90 CAP | Refills: 3 | Status: SHIPPED | OUTPATIENT
Start: 2019-02-21 | End: 2019-08-29 | Stop reason: SDUPTHER

## 2019-02-21 RX ORDER — IBUPROFEN 600 MG/1
TABLET ORAL
COMMUNITY
End: 2020-02-27

## 2019-02-21 RX ORDER — RIVASTIGMINE 13.3 MG/24H
PATCH, EXTENDED RELEASE TRANSDERMAL
Qty: 90 PATCH | Refills: 3 | Status: SHIPPED | OUTPATIENT
Start: 2019-02-21 | End: 2019-08-29 | Stop reason: SDUPTHER

## 2019-02-21 NOTE — PROGRESS NOTES
Patient is here for a follow up for Alzheimer's. Patients wife states that he had a fall on thanksgiving (down 4 steps), she states that he went into the hospital and they put him on tramadol but she states that he was very loopy and disoriented with it. She states also that he stopped the seroquel as well, she states that he was disoriented with that and completely out of it so they has taken him off it. He is sleeping a lot as well 10-12 hours a night.

## 2019-02-21 NOTE — PATIENT INSTRUCTIONS
Alzheimer's Disease: Care Instructions  Your Care Instructions    Alzheimer's disease is a type of dementia. It causes memory loss and affects judgment, language, and behavior. You may have trouble making decisions or may get lost in places that you used to know well. Alzheimer's disease is different than mild memory loss that occurs with aging. It is not clear what causes Alzheimer's disease, but it is the most common form of dementia in older adults. Finding out that you have this disease is a shock. You may be afraid and worried about how the condition will change your life. Although there is no cure at this time, medicine in some cases may slow memory loss for a while. Other medicines may be able to help you sleep or cope with depression and behavior changes. Alzheimer's disease is different for everyone. It may take many years to develop. In some cases, people can function well for a long time. In the early stage of the disease, you can do things at home to make life easier and safer. You also can keep doing your hobbies and other activities. Many people find comfort in planning now for their future needs. Follow-up care is a key part of your treatment and safety. Be sure to make and go to all appointments, and call your doctor if you are having problems. It's also a good idea to know your test results and keep a list of the medicines you take. How can you care for yourself at home? Taking care of yourself  · If your doctor gives you medicines, take them exactly as prescribed. Call your doctor if you think you are having a problem with your medicine. You will get more details on the medicines your doctor prescribes. · Eat a balanced diet. Get plenty of whole grains, fruits, and vegetables every day. If you are not hungry at mealtimes, eat snacks at midmorning and in the afternoon. Try drinks such as Boost, Ensure, or Sustacal if you are having trouble keeping your weight up. · Stay active.  Exercise such as walking may slow the decline of your mental abilities. Try to stay active mentally too. Read and work crossword puzzles if you enjoy these activities. · If you have trouble sleeping, do not nap during the day. Get regular exercise (but not within several hours of bedtime). Drink a glass of warm milk or caffeine-free herbal tea before going to bed. · Ask your doctor about support groups and other resources in your area. They can help people who have Alzheimer's disease and their families. · Be patient. You may find that a task takes you longer than it used to. · If you have not already done so, make a list of advance directives. Advance directives are instructions to your doctor and family members about what kind of care you want if you become unable to speak or express yourself. Talk to a  about making a will, if you do not already have one. Keeping schedules  · Develop a routine. You will feel less frustrated or confused if you have a clear, simple plan of what to do every day. ? Make lists of your medicines and when to take them. ? Write down appointments and other tasks in a calendar. ? Put sticky notes around the house to help you remember events and other things you have to do. ? Schedule activities and tasks for times of the day when you are best able to handle them. Staying safe  · Tell someone when you are going out and where you are going. Let the person know when you will be back. Before you go out alone, write down where you are going, how to get there, and how to get back home. Do this even if you have gone there many times before. Take someone along with you when possible. · Make your home safe. Tack down rugs, put no-slip tape in the tub, use handrails, and put safety switches on stoves and appliances. · Have a family member or other caregiver tell you whether you are driving badly. Deciding to stop driving is very hard for many people. Driving helps you feel independent.  Your state 's license bureau can do a driving test if there is any question. Plan for other means of getting around when you are no longer able to drive. · Use strong lighting, especially at night. Put night-lights in bedrooms, hallways, and bathrooms. · Lower the hot water temperature setting to 120°F or lower to avoid burns. When should you call for help? Call 911 anytime you think you may need emergency care. For example, call if:    · You are lost and do not know whom to call.     · You are injured and do not know whom to call.    Call your doctor now or seek immediate medical care if:    · Your symptoms suddenly get much worse.    Watch closely for changes in your health, and be sure to contact your doctor if:    · You want more information about how you can take care of yourself. Where can you learn more? Go to http://mirandaGlobal Velocitymavis.info/. Enter Y179 in the search box to learn more about \"Alzheimer's Disease: Care Instructions. \"  Current as of: September 11, 2018  Content Version: 11.9  © 6286-5145 [employer]. Care instructions adapted under license by [employer] (which disclaims liability or warranty for this information). If you have questions about a medical condition or this instruction, always ask your healthcare professional. [address] any warranty or liability for your use of this information. A Healthy Lifestyle: Care Instructions  Your Care Instructions    A healthy lifestyle can help you feel good, stay at a healthy weight, and have plenty of energy for both work and play. A healthy lifestyle is something you can share with your whole family. A healthy lifestyle also can lower your risk for serious health problems, such as high blood pressure, heart disease, and diabetes. You can follow a few steps listed below to improve your health and the health of your family.   Follow-up care is a key part of your treatment and safety. Be sure to make and go to all appointments, and call your doctor if you are having problems. It's also a good idea to know your test results and keep a list of the medicines you take. How can you care for yourself at home? · Do not eat too much sugar, fat, or fast foods. You can still have dessert and treats now and then. The goal is moderation. · Start small to improve your eating habits. Pay attention to portion sizes, drink less juice and soda pop, and eat more fruits and vegetables. ? Eat a healthy amount of food. A 3-ounce serving of meat, for example, is about the size of a deck of cards. Fill the rest of your plate with vegetables and whole grains. ? Limit the amount of soda and sports drinks you have every day. Drink more water when you are thirsty. ? Eat at least 5 servings of fruits and vegetables every day. It may seem like a lot, but it is not hard to reach this goal. A serving or helping is 1 piece of fruit, 1 cup of vegetables, or 2 cups of leafy, raw vegetables. Have an apple or some carrot sticks as an afternoon snack instead of a candy bar. Try to have fruits and/or vegetables at every meal.  · Make exercise part of your daily routine. You may want to start with simple activities, such as walking, bicycling, or slow swimming. Try to be active 30 to 60 minutes every day. You do not need to do all 30 to 60 minutes all at once. For example, you can exercise 3 times a day for 10 or 20 minutes. Moderate exercise is safe for most people, but it is always a good idea to talk to your doctor before starting an exercise program.  · Keep moving. [place] the lawn, work in the garden, or [employer]. Take the stairs instead of the elevator at work. · If you smoke, quit. People who smoke have an increased risk for heart attack, stroke, cancer, and other lung illnesses. Quitting is hard, but there are ways to boost your chance of quitting tobacco for good. ?  Use nicotine gum, patches, or lozenges. ? Ask your doctor about stop-smoking programs and medicines. ? Keep trying. In addition to reducing your risk of diseases in the future, you will notice some benefits soon after you stop using tobacco. If you have shortness of breath or asthma symptoms, they will likely get better within a few weeks after you quit. · Limit how much alcohol you drink. Moderate amounts of alcohol (up to 2 drinks a day for men, 1 drink a day for women) are okay. But drinking too much can lead to liver problems, high blood pressure, and other health problems. Family health  If you have a family, there are many things you can do together to improve your health. · Eat meals together as a family as often as possible. · Eat healthy foods. This includes fruits, vegetables, lean meats and dairy, and whole grains. · Include your family in your fitness plan. Most people think of activities such as jogging or tennis as the way to fitness, but there are many ways you and your family can be more active. Anything that makes you breathe hard and gets your heart pumping is exercise. Here are some tips:  ? Walk to do errands or to take your child to school or the bus.  ? Go for a family bike ride after dinner instead of watching TV. Where can you learn more? Go to http://miranda-mavis.info/. Enter I942 in the search box to learn more about \"A Healthy Lifestyle: Care Instructions. \"  Current as of: September 11, 2018  Content Version: 11.9  © 1337-1992 [employer]. Care instructions adapted under license by [employer] (which disclaims liability or warranty for this information). If you have questions about a medical condition or this instruction, always ask your healthcare professional. [place] [address] any warranty or liability for your use of this information.

## 2019-02-21 NOTE — PROGRESS NOTES
Date:     Name:  Fely Go. :  1945  MRN:  [id number]       REQUESTING PHYSICIAN:  Jadyn Rosales MD    Chief Complaint   Patient presents with    Dementia     Follow Up     HISTORY OF PRESENT ILLNESS:  Follow up for dementia, likely Alzheimer's type. He is accompanied by his wife who helps to provide history. Still has a lot of repetitive behaviors as well as OCD like behaviors. He is not picking things off the floor as much. However, he has started to pick at the treads in his shoes. He is still hard to get him motivated and he can be a little agitated at times. He is not combative or violent. With regard to his ADLs, he needs a lot of verbal prompts and she has to prompt him a lot. No dangerous behaviors or wandering. He did have PT for gait and balance. This did help but he won't do the exercises. Wife indicates that he just wants to watch TV and eat. He did stop the Seroquel.  No hallucinations     Answers provided by:  Patient  Family member: wife    Review of Systems - History obtained from spouse and the patient  General ROS: negative  Psychological ROS: positive for - disorientation, irritability and memory difficulties  ENT ROS: negative for - headaches, hearing change, nasal congestion, nasal discharge, tinnitus, vertigo, visual changes or vocal changes  Hematological and Lymphatic ROS: negative for - bleeding problems, blood clots, bruising, jaundice, night sweats or weight loss  Endocrine ROS: negative for - skin changes, temperature intolerance or unexpected weight changes  Respiratory ROS: no cough, shortness of breath, or wheezing  Cardiovascular ROS: no chest pain or dyspnea on exertion  Gastrointestinal ROS: no abdominal pain, change in bowel habits, or black or bloody stools  Genito-Urinary ROS: no dysuria, trouble voiding, or hematuria  Musculoskeletal ROS: positive for - gait disturbance  Neurological ROS: no TIA or stroke symptoms  positive for - behavioral changes, confusion, gait disturbance, impaired coordination/balance and memory loss  negative for - bowel and bladder control changes, dizziness, headaches, numbness/tingling, seizures, speech problems, tremors, visual changes or weakness  Dermatological ROS: negative for - nail changes, pruritus or rash     Current Outpatient Medications   Medication Sig    ibuprofen (MOTRIN) 600 mg tablet ibuprofen 600 mg tablet    ergocalciferol (ERGOCALCIFEROL) 50,000 unit capsule Take 50,000 Units by mouth every seven (7) days. Take one capsule once/week.  levothyroxine (SYNTHROID) 75 mcg tablet Take 0.075 Tabs by mouth daily. Take one tablet daily    b complex vitamins tablet Take 1 Tab by mouth daily. take 400mg (1 tab) daily.  vitamin E, dl,tocopheryl acet, (VITAMIN E, DL, ACETATE, PO) Take  by mouth.  rivastigmine (EXELON) 13.3 mg/24 hour patch APPLY 1 PATCH TOPICALLY DAILY    memantine ER (NAMENDA XR) 28 mg capsule TAKE 1 CAPSULE BY MOUTH EVERY DAY    phosphatidylse-omega-3-dha-epa (VAYACOG) 100-19.5-6.5 mg cap Take 1 Cap by mouth daily.  multivitamin (ONE A DAY) tablet Take 1 Tab by mouth daily.  ascorbic acid (VITAMIN C) 500 mg tablet Take  by mouth. No current facility-administered medications for this visit.       No Known Allergies  Past Medical History:   Diagnosis Date    Alcohol abuse     Falls     Fatigue     Liver disease     Memory loss     Muscle weakness     N&V (nausea and vomiting)     Polio     Seizures (HCC)     Sleep apnea     Snoring     Thyroid disease      Past Surgical History:   Procedure Laterality Date    HX APPENDECTOMY      HX ORTHOPAEDIC  05/2017    repaired 3 bones broken in his leg     HX OTHER SURGICAL  March 2016    gallbladder removed      Social History     Socioeconomic History    Marital status:      Spouse name: Not on file    Number of children: Not on file    Years of education: Not on file    Highest education level: Not on file   Social Needs  Financial resource strain: Not on file   [place] insecurity - worry: Not on file    Food insecurity - inability: Not on file   [employer] needs - medical: Not on file   [employer] needs - non-medical: Not on file   Occupational History    Not on file   Tobacco Use    Smoking status: Never Smoker    Smokeless tobacco: Never Used   Substance and Sexual Activity    Alcohol use: Not on file    Drug use: Not on file    Sexual activity: Not on file   Other Topics Concern    Not on file   Social History Narrative    Not on file     History reviewed. No pertinent family history. PHYSICAL EXAMINATION:    Visit Vitals  /78   Pulse (!) 57   Resp 20   Ht 5' 9\" (1.753 m)   Wt 93 kg (205 lb)   SpO2 98%   BMI 30.27 kg/m²     General: Well defined, nourished, and groomed individual in no acute distress.    Neck: Supple, nontender, no bruits, no pain with resistance to active range of motion.    Heart: Regular rate and rhythm, no murmurs, rub, or gallop. Normal S1S2.    Lungs: Clear to auscultation bilaterally with equal chest expansion, no cough, no wheeze    Musculoskeletal: Extremities revealed no edema and had full range of motion of joints.    Psych: Good mood and bright affect    NEUROLOGICAL EXAMINATION:    Mental Status: Alert and oriented to person and place. Cranial Nerves:    II, III, IV, VI: Visual acuity grossly intact. Visual fields are normal.    Pupils are equal, round, and reactive to light and accommodation.    Extra-ocular movements are full and fluid. Fundoscopic exam was benign, no ptosis or nystagmus.    V-XII: Hearing is grossly intact. Facial features are symmetric, with normal sensation and strength. The palate rises symmetrically and the tongue protrudes midline. Sternocleidomastoids 5/5.    Motor Examination: Normal tone, bulk, and strength, 5/5 muscle strength throughout. No cogwheel rigidity or clonus present.    Sensory exam: Normal throughout to temperature.  Normal proprioception.    Coordination: Finger to nose and rapid arm movement testing was normal. No resting or intention tremor    Gait and Station: Steady while walking on toes, heels, and with tandem walking. Normal arm swing. No Rhomberg or pronator drift. No muscle wasting or fasiculations noted.    Reflexes: DTRs 2+ throughout. ASSESSMENT AND PLAN    ICD-10-CM ICD-9-CM    1. Late onset Alzheimer's disease with behavioral disturbance G30.1 331.0 rivastigmine (EXELON) 13.3 mg/24 hour patch    F02.81 294.11 memantine ER (NAMENDA XR) 28 mg capsule      phosphatidylse-omega-3-dha-epa (VAYACOG) 100-19.5-6.5 mg cap     Late onset Alzheimer's disease with behavioral disturbance and hallucinations. The hallucinations and behavioral disturbance are well treated with the Seroquel and redirection. Overall, the AD is fairly stable with expected progression. Continue with Namenda, Exelon patch and Seroquel as previously prescribed. With regard to the gait issues, this is improved and he has done well with home health PT. Try to encourage ongoing HEP    Follow up in six months or sooner if needed. Tara Kaminski

## 2019-08-29 ENCOUNTER — OFFICE VISIT (OUTPATIENT)
Dept: NEUROLOGY | Age: 74
End: 2019-08-29

## 2019-08-29 VITALS
DIASTOLIC BLOOD PRESSURE: 64 MMHG | BODY MASS INDEX: 28.88 KG/M2 | WEIGHT: 195 LBS | HEART RATE: 60 BPM | HEIGHT: 69 IN | SYSTOLIC BLOOD PRESSURE: 126 MMHG | OXYGEN SATURATION: 98 % | RESPIRATION RATE: 20 BRPM

## 2019-08-29 DIAGNOSIS — G30.1 LATE ONSET ALZHEIMER'S DISEASE WITH BEHAVIORAL DISTURBANCE (HCC): ICD-10-CM

## 2019-08-29 DIAGNOSIS — F02.818 LATE ONSET ALZHEIMER'S DISEASE WITH BEHAVIORAL DISTURBANCE (HCC): ICD-10-CM

## 2019-08-29 RX ORDER — MEMANTINE HYDROCHLORIDE 28 MG/1
CAPSULE, EXTENDED RELEASE ORAL
Qty: 90 CAP | Refills: 3 | Status: SHIPPED | OUTPATIENT
Start: 2019-08-29 | End: 2020-02-27 | Stop reason: SDUPTHER

## 2019-08-29 RX ORDER — RIVASTIGMINE 13.3 MG/24H
PATCH, EXTENDED RELEASE TRANSDERMAL
Qty: 90 PATCH | Refills: 3 | Status: SHIPPED | OUTPATIENT
Start: 2019-08-29 | End: 2020-02-27 | Stop reason: SDUPTHER

## 2019-08-29 NOTE — PROGRESS NOTES
Last visit was in February     Alzheimers- comes and goes   Sloane Salcedo to direct him, has a harder time for him to take a shower, has to have more assistance with showering     He is starting to wander a little more   He is still doing there around the house, he takes the trash out but she usually has to go find him he could be doing anything after taking the trash

## 2019-08-29 NOTE — PROGRESS NOTES
Date:  2019    Name:  Arash Rowe. :  1945  MRN:  [id number]       REQUESTING PHYSICIAN:  Zack Fox MD    Chief Complaint   Patient presents with    Alzheimers     HISTORY OF PRESENT ILLNESS:  Follow up for dementia, likely Alzheimer's type. He is accompanied by his wife who helps to provide history. There are times when he is going out into the yard. One time, he was out in the yard picking up sticks and had lost control of his bladder. There is some concern for this to be an occasion of mild confusion. Still has a lot of repetitive behaviors as well as OCD like behaviors. Still doing some picking behaviors. He is still hard to get him motivated and he can be a little agitated at times. He is not combative or violent. With regard to his ADLs, he needs a lot of verbal prompts and she has to prompt him a lot. No dangerous behaviors. Wife indicates that he just wants to watch TV and eat and go to his [place] [id number] meetings. Answers provided by:  Patient  Family member: wife     Recap from his last office visit:  Late onset Alzheimer's disease with behavioral disturbance and hallucinations. The hallucinations and behavioral disturbance are well treated with the Seroquel and redirection. Overall, the AD is fairly stable with expected progression. Continue with Namenda, Exelon patch and Seroquel as previously prescribed. With regard to the gait issues, this is improved and he has done well with home health PT. Try to encourage ongoing HEP     Current Outpatient Medications   Medication Sig    ibuprofen (MOTRIN) 600 mg tablet ibuprofen 600 mg tablet    rivastigmine (EXELON) 13.3 mg/24 hour patch APPLY 1 PATCH TOPICALLY DAILY    memantine ER (NAMENDA XR) 28 mg capsule TAKE 1 CAPSULE BY MOUTH EVERY DAY    phosphatidylse-omega-3-dha-epa (VAYACOG) 100-19.5-6.5 mg cap Take 1 Cap by mouth daily.     ergocalciferol (ERGOCALCIFEROL) 50,000 unit capsule Take 50,000 Units by mouth every seven (7) days. Take one capsule once/week.  levothyroxine (SYNTHROID) 75 mcg tablet Take 0.075 Tabs by mouth daily. Take one tablet daily    b complex vitamins tablet Take 1 Tab by mouth daily. take 400mg (1 tab) daily.  vitamin E, dl,tocopheryl acet, (VITAMIN E, DL, ACETATE, PO) Take  by mouth.  multivitamin (ONE A DAY) tablet Take 1 Tab by mouth daily.  ascorbic acid (VITAMIN C) 500 mg tablet Take  by mouth. No current facility-administered medications for this visit.       No Known Allergies  Past Medical History:   Diagnosis Date    Alcohol abuse     Falls     Fatigue     Liver disease     Memory loss     Muscle weakness     N&V (nausea and vomiting)     Polio     Seizures (HCC)     Sleep apnea     Snoring     Thyroid disease      Past Surgical History:   Procedure Laterality Date    HX APPENDECTOMY      HX ORTHOPAEDIC  05/2017    repaired 3 bones broken in his leg     HX OTHER SURGICAL  March 2016    gallbladder removed      Social History     Socioeconomic History    Marital status:      Spouse name: Not on file    Number of children: Not on file    Years of education: Not on file    Highest education level: Not on file   Occupational History    Not on file   Social Needs    Financial resource strain: Not on file    Food insecurity:     Worry: Not on file     Inability: Not on file    Transportation needs:     Medical: Not on file     Non-medical: Not on file   Tobacco Use    Smoking status: Never Smoker    Smokeless tobacco: Never Used   Substance and Sexual Activity    Alcohol use: Not on file    Drug use: Not on file    Sexual activity: Not on file   Lifestyle    Physical activity:     Days per week: Not on file     Minutes per session: Not on file    Stress: Not on file   Relationships    Social connections:     Talks on phone: Not on file     Gets together: Not on file     Attends [employer] service: Not on file     Active member of club or organization: Not on file     Attends meetings of clubs or organizations: Not on file     Relationship status: Not on file    Intimate partner violence:     Fear of current or ex partner: Not on file     Emotionally abused: Not on file     Physically abused: Not on file     Forced sexual activity: Not on file   Other Topics Concern    Not on file   Social History Narrative    Not on file     History reviewed. No pertinent family history. PHYSICAL EXAMINATION:    Visit Vitals  /64   Pulse 60   Resp 20   Ht 5' 9\" (1.753 m)   Wt 88.5 kg (195 lb)   SpO2 98%   BMI 28.80 kg/m²     General: Well defined, nourished, and groomed individual in no acute distress.    Neck: Supple, nontender, no bruits, no pain with resistance to active range of motion.    Heart: Regular rate and rhythm, no murmurs, rub, or gallop. Normal S1S2.    Lungs: Clear to auscultation bilaterally with equal chest expansion, no cough, no wheeze    Musculoskeletal: Extremities revealed no edema and had full range of motion of joints.    Psych: Good mood and bright affect    NEUROLOGICAL EXAMINATION:    Mental Status: Alert and oriented to person and place. Cranial Nerves:    II, III, IV, VI: Visual acuity grossly intact. Visual fields are normal.    Pupils are equal, round, and reactive to light and accommodation.    Extra-ocular movements are full and fluid. Fundoscopic exam was benign, no ptosis or nystagmus.    V-XII: Hearing is grossly intact. Facial features are symmetric, with normal sensation and strength. The palate rises symmetrically and the tongue protrudes midline. Sternocleidomastoids 5/5.    Motor Examination: Normal tone, bulk, and strength, 5/5 muscle strength throughout. No cogwheel rigidity or clonus present.    Sensory exam: Normal throughout to temperature.  Normal proprioception.    Coordination: Finger to nose and rapid arm movement testing was normal. No resting or intention tremor    Gait and Station: Steady while walking on toes, heels, and with tandem walking. Normal arm swing. No Rhomberg or pronator drift. No muscle wasting or fasiculations noted.    Reflexes: DTRs 2+ throughout. ASSESSMENT AND PLAN    ICD-10-CM ICD-9-CM    1. Late onset Alzheimer's disease with behavioral disturbance G30.1 331.0 rivastigmine (EXELON) 13.3 mg/24 hour patch    F02.81 294.11 memantine ER (NAMENDA XR) 28 mg capsule      phosphatidylse-omega-3-dha-epa (VAYACOG) 100-19.5-6.5 mg cap     Late onset Alzheimer's type dementia with mild behavioral disturbances to include some minimal agitation. He is not combative or violent in any way and he is able to be redirected. This does appear to be progressing his expected. He will continue with Exelon 13.3 mg daily along with Namenda XR 28 mg daily and Vayacog if they can get it. Follow-up in 6 months or sooner if needed. Follow up in six months or sooner if needed. Tara Croft

## 2020-02-27 ENCOUNTER — OFFICE VISIT (OUTPATIENT)
Dept: NEUROLOGY | Age: 75
End: 2020-02-27

## 2020-02-27 VITALS
SYSTOLIC BLOOD PRESSURE: 128 MMHG | OXYGEN SATURATION: 98 % | RESPIRATION RATE: 18 BRPM | WEIGHT: 198 LBS | BODY MASS INDEX: 27.72 KG/M2 | HEIGHT: 71 IN | DIASTOLIC BLOOD PRESSURE: 70 MMHG | HEART RATE: 74 BPM

## 2020-02-27 DIAGNOSIS — G30.1 LATE ONSET ALZHEIMER'S DISEASE WITH BEHAVIORAL DISTURBANCE (HCC): ICD-10-CM

## 2020-02-27 DIAGNOSIS — F02.818 LATE ONSET ALZHEIMER'S DISEASE WITH BEHAVIORAL DISTURBANCE (HCC): ICD-10-CM

## 2020-02-27 RX ORDER — MEMANTINE HYDROCHLORIDE 28 MG/1
CAPSULE, EXTENDED RELEASE ORAL
Qty: 90 CAP | Refills: 3 | Status: SHIPPED | OUTPATIENT
Start: 2020-02-27 | End: 2020-05-20

## 2020-02-27 RX ORDER — RIVASTIGMINE 13.3 MG/24H
PATCH, EXTENDED RELEASE TRANSDERMAL
Qty: 90 PATCH | Refills: 3 | Status: SHIPPED | OUTPATIENT
Start: 2020-02-27 | End: 2020-05-20

## 2020-02-27 NOTE — PROGRESS NOTES
Patient wife states he is declining, he is forgetting to bathe and eat, she states she has to remind him to walk also. .  Chief Complaint   Patient presents with    Follow-up     alzheimers     .   Visit Vitals  /70 (BP 1 Location: Left arm, BP Patient Position: Sitting)   Pulse 74   Resp 18   Ht 5' 11\" (1.803 m)   Wt 198 lb (89.8 kg)   SpO2 98%   BMI 27.62 kg/m²

## 2020-02-27 NOTE — PROGRESS NOTES
Date:  2019    Name:  Irish Lambert. :  1945  MRN:  [id number]       REQUESTING PHYSICIAN:  Jorge Zavala MD    Chief Complaint   Patient presents with    Follow-up     alzheimers     HISTORY OF PRESENT ILLNESS:  Follow up for dementia, likely Alzheimer's type. He is accompanied by his wife who helps to provide history. He still has a lot of repetitive behaviors as well as OCD like behaviors, picking behaviors. He is not combative or violent. With regard to his ADLs, he needs a lot of verbal prompts. In addition to needing the prompts, she has actually found that she needs to be in the shower bath helping him otherwise he just lets the water run without actually cleaning himself. There are times when he has difficulties figuring out how to use utensils in order to eat. No dangerous behaviors. Wife indicates that he still enjoys watching TV and going to [employer]. Answers provided by:  Patient  Family member: wife     Recap from his last office visit:  Late onset Alzheimer's type dementia with mild behavioral disturbances to include some minimal agitation. He is not combative or violent in any way and he is able to be redirected. This does appear to be progressing his expected. He will continue with Exelon 13.3 mg daily along with Namenda XR 28 mg daily and Vayacog if they can get it. Follow-up in 6 months or sooner if needed. Current Outpatient Medications   Medication Sig    rivastigmine (EXELON) 13.3 mg/24 hour patch APPLY 1 PATCH TOPICALLY DAILY    memantine ER (NAMENDA XR) 28 mg capsule TAKE 1 CAPSULE BY MOUTH EVERY DAY    phosphatidylse-omega-3-dha-epa (VAYACOG) 100-19.5-6.5 mg cap Take 1 Cap by mouth daily.  ergocalciferol (ERGOCALCIFEROL) 50,000 unit capsule Take 50,000 Units by mouth every seven (7) days. Take one capsule once/week.  levothyroxine (SYNTHROID) 75 mcg tablet Take 0.075 Tabs by mouth daily.  Take one tablet daily    b complex vitamins tablet Take 1 Tab by mouth daily. take 400mg (1 tab) daily.  vitamin E, dl,tocopheryl acet, (VITAMIN E, DL, ACETATE, PO) Take  by mouth.  multivitamin (ONE A DAY) tablet Take 1 Tab by mouth daily.  ascorbic acid (VITAMIN C) 500 mg tablet Take  by mouth. No current facility-administered medications for this visit.       No Known Allergies  Past Medical History:   Diagnosis Date    Alcohol abuse     Falls     Fatigue     Liver disease     Memory loss     Muscle weakness     N&V (nausea and vomiting)     Polio     Seizures (HCC)     Sleep apnea     Snoring     Thyroid disease      Past Surgical History:   Procedure Laterality Date    HX APPENDECTOMY      HX ORTHOPAEDIC  05/2017    repaired 3 bones broken in his leg     HX OTHER SURGICAL  March 2016    gallbladder removed      Social History     Socioeconomic History    Marital status:      Spouse name: Not on file    Number of children: Not on file    Years of education: Not on file    Highest education level: Not on file   Occupational History    Not on file   Social Needs    Financial resource strain: Not on file    Food insecurity:     Worry: Not on file     Inability: Not on file    Transportation needs:     Medical: Not on file     Non-medical: Not on file   Tobacco Use    Smoking status: Never Smoker    Smokeless tobacco: Never Used   Substance and Sexual Activity    Alcohol use: Not on file    Drug use: Not on file    Sexual activity: Not on file   Lifestyle    Physical activity:     Days per week: Not on file     Minutes per session: Not on file    Stress: Not on file   Relationships    Social connections:     Talks on phone: Not on file     Gets together: Not on file     Attends [employer] service: Not on file     Active member of club or organization: Not on file     Attends meetings of clubs or organizations: Not on file     Relationship status: Not on file    Intimate partner violence:     Fear of current or ex partner: Not on file     Emotionally abused: Not on file     Physically abused: Not on file     Forced sexual activity: Not on file   Other Topics Concern    Not on file   Social History Narrative    Not on file     History reviewed. No pertinent family history. PHYSICAL EXAMINATION:    Visit Vitals  /70 (BP 1 Location: Left arm, BP Patient Position: Sitting)   Pulse 74   Resp 18   Ht 5' 11\" (1.803 m)   Wt 89.8 kg (198 lb)   SpO2 98%   BMI 27.62 kg/m²     General: Well defined, nourished, and groomed individual in no acute distress.    Neck: Supple, nontender, no bruits, no pain with resistance to active range of motion.    Heart: Regular rate and rhythm, no murmurs, rub, or gallop. Normal S1S2.    Lungs: Clear to auscultation bilaterally with equal chest expansion, no cough, no wheeze    Musculoskeletal: Extremities revealed no edema and had full range of motion of joints.    Psych: Good mood and bright affect    NEUROLOGICAL EXAMINATION:    Mental Status: Alert and oriented to person and place. Cranial Nerves:    II, III, IV, VI: Visual acuity grossly intact. Visual fields are normal.    Pupils are equal, round, and reactive to light and accommodation.    Extra-ocular movements are full and fluid. Fundoscopic exam was benign, no ptosis or nystagmus.    V-XII: Hearing is grossly intact. Facial features are symmetric, with normal sensation and strength. The palate rises symmetrically and the tongue protrudes midline. Sternocleidomastoids 5/5.    Motor Examination: Normal tone, bulk, and strength, 5/5 muscle strength throughout. No cogwheel rigidity or clonus present.    Sensory exam: Normal throughout to temperature. Normal proprioception.    Coordination: Finger to nose and rapid arm movement testing was normal. No resting or intention tremor    Gait and Station: Steady while walking on toes, heels, and with tandem walking. Normal arm swing. No Rhomberg or pronator drift.  No muscle wasting or fasiculations noted.    Reflexes: DTRs 2+ throughout. ASSESSMENT AND PLAN    ICD-10-CM ICD-9-CM    1. Late onset Alzheimer's disease with behavioral disturbance (HCC) G30.1 331.0 rivastigmine (EXELON) 13.3 mg/24 hour patch    F02.81 294.11 memantine ER (NAMENDA XR) 28 mg capsule      Again more progression with regard to his functional status. Gratefully, there are no behavioral issues of any significant concern at this point. His wife unfortunately is 1 of the only caregivers for him. We did discuss the fact that she does not have a plan for his future care as he moves into more severe stages of the disease. I recommended that she follow-up with the  from the [employer], Payal Ferguson. We will try to have this arranged for her. Otherwise, he will continue taking Exelon patch 13.3 mg and Namenda XR 28 mg as previously prescribed. Follow-up in 6 months or sooner if needed. Tara Murphy

## 2020-05-09 ENCOUNTER — IP HISTORICAL/CONVERTED ENCOUNTER (OUTPATIENT)
Dept: OTHER | Age: 75
End: 2020-05-09

## 2020-05-20 DIAGNOSIS — G30.1 LATE ONSET ALZHEIMER'S DISEASE WITH BEHAVIORAL DISTURBANCE (HCC): ICD-10-CM

## 2020-05-20 DIAGNOSIS — F02.818 LATE ONSET ALZHEIMER'S DISEASE WITH BEHAVIORAL DISTURBANCE (HCC): ICD-10-CM

## 2020-05-20 RX ORDER — MEMANTINE HYDROCHLORIDE 28 MG/1
CAPSULE, EXTENDED RELEASE ORAL
Qty: 90 CAP | Refills: 3 | Status: SHIPPED | OUTPATIENT
Start: 2020-05-20

## 2020-05-20 RX ORDER — RIVASTIGMINE 13.3 MG/24H
PATCH, EXTENDED RELEASE TRANSDERMAL
Qty: 210 PATCH | Refills: 1 | Status: SHIPPED | OUTPATIENT
Start: 2020-05-20

## 2020-06-28 ENCOUNTER — OP HISTORICAL/CONVERTED ENCOUNTER (OUTPATIENT)
Dept: OTHER | Age: 75
End: 2020-06-28

## 2020-08-30 NOTE — PROGRESS NOTES
This patient was seen and evaluated in association with Ms. Rhodes Toodayami and an independent history and physical were performed. His memory per his wife seems about the same. At his last office visit, we tried to start him on Nuplazid off label for management of the hallucinations but the insurance denied this. He does continue to have some mild hallucinations which are not bothersome. His MMSE today was 23/30 as documented. His exam is also as documented by Ms. Rhodesju Fisher. We discussed his diagnosis, prognosis, and progression. We discussed the hallucinations and the potential for these to worsen. For now, she would like to hold off on trying to treat these as they are not bothersome to her and their friends and family understand what is going on. Continue with Aricept 10mg, Namenda XR 28mg, and Axona for memory management.  Follow up in six months Unknown